# Patient Record
Sex: MALE | Race: OTHER | Employment: OTHER | URBAN - METROPOLITAN AREA
[De-identification: names, ages, dates, MRNs, and addresses within clinical notes are randomized per-mention and may not be internally consistent; named-entity substitution may affect disease eponyms.]

---

## 2021-01-13 ENCOUNTER — OFFICE VISIT (OUTPATIENT)
Dept: DERMATOLOGY CLINIC | Facility: CLINIC | Age: 56
End: 2021-01-13
Payer: COMMERCIAL

## 2021-01-13 VITALS — WEIGHT: 245 LBS | HEIGHT: 71 IN | BODY MASS INDEX: 34.3 KG/M2

## 2021-01-13 DIAGNOSIS — L98.9 PAINFUL SKIN LESION: ICD-10-CM

## 2021-01-13 DIAGNOSIS — L91.8 INFLAMED ACROCHORDON: ICD-10-CM

## 2021-01-13 DIAGNOSIS — D23.9 BENIGN NEOPLASM OF SKIN, UNSPECIFIED LOCATION: ICD-10-CM

## 2021-01-13 DIAGNOSIS — H02.824 CYST OF LEFT UPPER EYELID: Primary | ICD-10-CM

## 2021-01-13 DIAGNOSIS — L91.8 ACHROCHORDON: ICD-10-CM

## 2021-01-13 PROCEDURE — 99202 OFFICE O/P NEW SF 15 MIN: CPT | Performed by: DERMATOLOGY

## 2021-01-13 PROCEDURE — 3008F BODY MASS INDEX DOCD: CPT | Performed by: DERMATOLOGY

## 2021-01-13 PROCEDURE — 11200 RMVL SKIN TAGS UP TO&INC 15: CPT | Performed by: DERMATOLOGY

## 2021-01-14 NOTE — PROGRESS NOTES
Neena Lozano is a 63year old male. Patient presents with:  Consult: white round growth on upper eyelid of left eye x 5  months denies itch ,bleed or pain ,denies personal HX ,Mother had Bcc             Patient has no allergy information on record. of current or ex partner: Not on file        Emotionally abused: Not on file        Physically abused: Not on file        Forced sexual activity: Not on file    Other Topics      Concerns:        [place]: Not Asked        History of tanning: Not skin, unspecified location    Assessment / plan:  Cystic lesion left upper medial lid referred to oculoplastics for excision given the location. Inflamed acrochordons scissors removal, irritated skin tags medically necessary as inflamed.   Lesions treate